# Patient Record
Sex: MALE | Race: WHITE
[De-identification: names, ages, dates, MRNs, and addresses within clinical notes are randomized per-mention and may not be internally consistent; named-entity substitution may affect disease eponyms.]

---

## 2017-10-05 ENCOUNTER — HOSPITAL ENCOUNTER (EMERGENCY)
Dept: HOSPITAL 76 - ED | Age: 22
Discharge: HOME | End: 2017-10-05
Payer: COMMERCIAL

## 2017-10-05 VITALS — DIASTOLIC BLOOD PRESSURE: 79 MMHG | SYSTOLIC BLOOD PRESSURE: 114 MMHG

## 2017-10-05 DIAGNOSIS — S68.122A: Primary | ICD-10-CM
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
## 2017-10-05 PROCEDURE — 1040M: CPT

## 2017-10-05 PROCEDURE — 99283 EMERGENCY DEPT VISIT LOW MDM: CPT

## 2017-10-05 NOTE — ED PHYSICIAN DOCUMENTATION
History of Present Illness





- Stated complaint


Stated Complaint: FINGER LAC





- Chief complaint


Chief Complaint: Wound





- Additonal information


Additional information: 





hx from pt


R third fingertip amputation on mandelin at work


healthy


tdap UTD





Review of Systems


Skin: reports: Other (fingertip amp)





PD PAST MEDICAL HISTORY





- Past Medical History


Past Medical History: Yes





- Present Medications


Home Medications: 


 Ambulatory Orders











 Medication  Instructions  Recorded  Confirmed


 


Escitalopram [Lexapro] 10 mg PO DAILY 10/05/17 10/05/17














- Allergies


Allergies/Adverse Reactions: 


 Allergies











Allergy/AdvReac Type Severity Reaction Status Date / Time


 


No Known Drug Allergies Allergy   Verified 10/05/17 08:17














- Social History


Does the pt smoke?: No


Smoking Status: Never smoker





- Immunizations


Immunizations are current?: Yes





PD ED PE NORMAL





- Vitals


Vital signs reviewed: Yes





- Extremities


Extremities: Other (R third finger small 1< 1 cm ST complete avulsion, no nail 

or bone involves, MSV intact)





Results





- Vitals


Vitals: 


 Vital Signs - 24 hr











  10/05/17





  08:17


 


Temperature 36.4 C L


 


Heart Rate 78


 


Respiratory 16





Rate 


 


Blood Pressure 116/76


 


O2 Saturation 99








 Oxygen











O2 Source                      Room air

















Departure





- Departure


Disposition: 01 Home, Self Care


Clinical Impression: 


Fingertip amputation


Qualifiers:


 Encounter type: initial encounter Qualified Code(s): S68.129A - Partial 

traumatic metacarpophalangeal amputation of unspecified finger, initial 

encounter





Condition: Good


Instructions:  ED Laceration Amputation Finger Tip Open Tx


Comments: 


Leave the dressing we placed on for 48 hr.


Then may gently remove (recommend soaking in water first to prevent sticking)


After that should be able to gently wash, apply antibiotic ointment and a clean 

fingertip bandaid once daily.


This will take about 2 weeks to heal.


This was a clean cut and so infection is unlikely but if you notice increasing 

redness swelling or pain, please come back for a wound check

## 2018-10-22 ENCOUNTER — HOSPITAL ENCOUNTER (EMERGENCY)
Dept: HOSPITAL 76 - ED | Age: 23
Discharge: HOME | End: 2018-10-22
Payer: COMMERCIAL

## 2018-10-22 VITALS — DIASTOLIC BLOOD PRESSURE: 78 MMHG | SYSTOLIC BLOOD PRESSURE: 121 MMHG

## 2018-10-22 DIAGNOSIS — M54.6: Primary | ICD-10-CM

## 2018-10-22 LAB
CLARITY UR REFRACT.AUTO: CLEAR
GLUCOSE UR QL STRIP.AUTO: NEGATIVE MG/DL
KETONES UR QL STRIP.AUTO: NEGATIVE MG/DL
NITRITE UR QL STRIP.AUTO: NEGATIVE
PH UR STRIP.AUTO: 7 PH (ref 5–7.5)
PROT UR STRIP.AUTO-MCNC: NEGATIVE MG/DL
RBC # UR STRIP.AUTO: NEGATIVE /UL
SP GR UR STRIP.AUTO: 1.01 (ref 1–1.03)
UROBILINOGEN UR QL STRIP.AUTO: (no result) E.U./DL
UROBILINOGEN UR STRIP.AUTO-MCNC: NEGATIVE MG/DL

## 2018-10-22 PROCEDURE — 81003 URINALYSIS AUTO W/O SCOPE: CPT

## 2018-10-22 PROCEDURE — 81001 URINALYSIS AUTO W/SCOPE: CPT

## 2018-10-22 PROCEDURE — 87086 URINE CULTURE/COLONY COUNT: CPT

## 2018-10-22 PROCEDURE — 71046 X-RAY EXAM CHEST 2 VIEWS: CPT

## 2018-10-22 PROCEDURE — 99283 EMERGENCY DEPT VISIT LOW MDM: CPT

## 2018-10-22 NOTE — XRAY REPORT
Reason:  back pain

Procedure Date:  10/22/2018   

Accession Number:  [id number] / [id number]                    

Procedure:  XR  - Chest 2 View X-Ray CPT Code:  35902

 

FULL RESULT:

 

 

EXAM:

CHEST RADIOGRAPHY

 

EXAM DATE: 10/22/2018 04:36 PM.

 

CLINICAL HISTORY: Back pain.

 

COMPARISON: None available.

 

TECHNIQUE: 2 views.

 

FINDINGS:

Heart size is normal. Mild asymmetric elevation of the left 

hemidiaphragm, which is nonspecific. No consolidation, pleural effusion, 

or pneumothorax.

IMPRESSION: No acute cardiopulmonary findings.

 

[facility]

## 2018-10-22 NOTE — ED PHYSICIAN DOCUMENTATION
PD HPI BACK INJURY





- Stated complaint


Stated Complaint: BACK/LEG PX





- History obtained from


History obtained from: Patient





- History of Present Illness


Location: Upper (Since yesterday without specific trauma he has had upper back 

pain that is worse with twisting or bending.  It radiates throughout the chest 

and down to the lower back.  It makes him short of breath.  Denies pedal edema 

or calf pain or recent travel.  No fevers.  No urinary complaints or hematuria. 

He had a similar episode for a few days in February that resolved without 

specific intervention.)





Review of Systems


Constitutional: reports: Reviewed and negative


Cardiac: reports: Reviewed and negative


Respiratory: reports: Reviewed and negative





PD PAST MEDICAL HISTORY





- Past Medical History


Past Medical History: No


Cardiovascular: None


Respiratory: None


Neuro: None


Endocrine/Autoimmune: None


GI: None


: None


HEENT: None


Psych: None


Musculoskeletal: None


Derm: None





- Past Surgical History


Past Surgical History: No





- Present Medications


Home Medications: 


                                Ambulatory Orders











 Medication  Instructions  Recorded  Confirmed


 


Cyclobenzaprine [Flexeril] 10 mg PO TID PRN #20 tablet 10/22/18 


 


Hydrocodone/Acetaminophen 1 - 2 each PO Q6H PRN #14 tablet 10/22/18 





[Hydrocodon-Acetaminophen 5-325]   














- Allergies


Allergies/Adverse Reactions: 


                                    Allergies











Allergy/AdvReac Type Severity Reaction Status Date / Time


 


No Known Drug Allergies Allergy   Verified 10/22/18 15:37














- Social History


Does the pt smoke?: No


Smoking Status: Never smoker


Does the pt drink ETOH?: No


Does the pt have substance abuse?: No





- Immunizations


Immunizations are current?: Yes





- POLST


Patient has POLST: No





PD ED PE NORMAL





- Vitals


Vital signs reviewed: Yes





- General


General: Alert and oriented X 3, No acute distress





- Cardiac


Cardiac: RRR, No murmur





- Respiratory


Respiratory: No respiratory distress, Clear bilaterally





- Abdomen


Abdomen: Non tender





- Back


Back: Other (Kind of diffuse muscular tenderness of the bilateral parathoracic 

muscles and he is hypersensitive over the whole back.  No specific tenderness.)





- Extremities


Extremities: Other (The patient has equal and normal Achilles and patellar 

reflexes bilaterally.  Normal sensation in all areas of the legs.  Patient 

denies saddle anesthesia.  Normal strength in flexion-extension at the ankles, 

knees, and flexion of the hips.)





- Neuro


Neuro: Alert and oriented X 3, Normal speech





Results





- Vitals


Vitals: 


                               Vital Signs - 24 hr











  10/22/18 10/22/18





  15:34 17:21


 


Temperature 36.5 C 


 


Heart Rate 97 80


 


Respiratory 16 





Rate  


 


Blood Pressure 122/73 121/78


 


O2 Saturation 100 100








                                     Oxygen











O2 Source                      Room air

















- Labs


Labs: 


                                Laboratory Tests











  10/22/18





  16:50


 


Urine Color  STRAW


 


Urine Clarity  CLEAR


 


Urine pH  7.0


 


Ur Specific Gravity  1.010


 


Urine Protein  NEGATIVE


 


Urine Glucose (UA)  NEGATIVE


 


Urine Ketones  NEGATIVE


 


Urine Occult Blood  NEGATIVE


 


Urine Nitrite  NEGATIVE


 


Urine Bilirubin  NEGATIVE


 


Urine Urobilinogen  0.2 (NORMAL)


 


Ur Leukocyte Esterase  NEGATIVE


 


Ur Microscopic Review  NOT INDICATED


 


Urine Culture Comments  NOT INDICATED














- Rads (name of study)


  ** 2v chest


Radiology: EMP read contemporaneously (normal)





PD MEDICAL DECISION MAKING





- ED course


ED course: 





23-year-old gentleman with seemingly musculoskeletal upper back pain with 

tenderness and motion related complaints. But some atypical complaints So we 

obtained a urinalysis to rule out hematuria/kidney stone. Also a chest x-ray 

given radiation to the chest, these were negative and he was feeling better 

after meds.





Departure





- Departure


Disposition: 01 Home, Self Care


Clinical Impression: 


 Back pain





Condition: Good


Record reviewed to determine appropriate education?: Yes


Instructions:  ED Neck Back Pain General


Prescriptions: 


Cyclobenzaprine [Flexeril] 10 mg PO TID PRN #20 tablet


 PRN Reason: Spasms


Hydrocodone/Acetaminophen [Hydrocodon-Acetaminophen 5-325] 1 - 2 each PO Q6H PRN

#14 tablet


 PRN Reason: pain


Comments: 


Call your doctor to arrange a follow-up appointment, make the next available 

appointment.  In the interim, return anytime if worse or if new symptoms 

develop.





Do not drink or drive while taking narcotic pain medication.


Note that many narcotic pain relievers also contain Tylenol/acetaminophen.  

Please ensure that your total dose of acetaminophen from all sources does not 

exceed 3 g (3000 mg) per day.


You may get constipated while on this medication.  Take a stool softener such as

Colace twice a day while you are on it.  Also add an over-the-counter laxative 

such as senna or MiraLAX on any day that you do not have a bowel movement.


If you received a narcotic pain medication or sedative while in the emergency 

department, do not drive for the next 24 hours.


Forms:  Activity restrictions


Discharge Date/Time: 10/22/18 17:26

## 2019-02-24 ENCOUNTER — HOSPITAL ENCOUNTER (EMERGENCY)
Dept: HOSPITAL 76 - ED | Age: 24
Discharge: HOME | End: 2019-02-24
Payer: COMMERCIAL

## 2019-02-24 VITALS — SYSTOLIC BLOOD PRESSURE: 123 MMHG | DIASTOLIC BLOOD PRESSURE: 70 MMHG

## 2019-02-24 DIAGNOSIS — R07.89: Primary | ICD-10-CM

## 2019-02-24 DIAGNOSIS — M54.2: ICD-10-CM

## 2019-02-24 PROCEDURE — 93005 ELECTROCARDIOGRAM TRACING: CPT

## 2019-02-24 PROCEDURE — 71045 X-RAY EXAM CHEST 1 VIEW: CPT

## 2019-02-24 PROCEDURE — 99283 EMERGENCY DEPT VISIT LOW MDM: CPT

## 2019-02-24 NOTE — XRAY REPORT
Reason:  chest pain

Procedure Date:  02/24/2019   

Accession Number:  [id number] / [id number]                    

Procedure:  XR  - Chest 1 View X-Ray CPT Code:  95306

 

FULL RESULT:

 

 

EXAM:

CHEST RADIOGRAPHY

 

EXAM DATE: 2/24/2019 12:40 PM.

 

CLINICAL HISTORY: Chest pain.

 

COMPARISON: Chest 2 view 10/22/2018 4:25 PM.

 

TECHNIQUE: 1 view.

 

FINDINGS:

Lungs/Pleura: There is a background of mild increased interstitial 

markings and overall increased density in the lungs compared to previous 

study. Relatively low lung volumes, however. No pneumothorax.

 

Mediastinum: Within exam limitations, the cardiomediastinal contour is 

normal.

 

Other: None.

 

IMPRESSION:

1. No pneumothorax.

2. Background of increased interstitial markings and overall increased 

density compared to previous exam. Relatively low lung volumes.

 

[facility]

## 2019-02-24 NOTE — ED PHYSICIAN DOCUMENTATION
PD HPI CHEST PAIN





- Stated complaint


Stated Complaint: DIFFICULTY BREATHING/UPPER ABD PX





- Chief complaint


Chief Complaint: Resp





- History obtained from


History obtained from: Patient





- History of Present Illness


Timing - onset: How many hours ago (3 days)


Timing - onset during: Other (all the time)


Timing - duration: Other (constant)


Timing - details: Gradual onset


Pain level max: 4


Pain level now: 4


Severity Comments: mild


Quality: Sharp


Location: Right chest


Radiation: Other (none)


Improved by: Other (none)





PD PAST MEDICAL HISTORY





- Past Medical History


Past Medical History: No


Cardiovascular: None


Respiratory: None


Neuro: None


Endocrine/Autoimmune: None


GI: None


: None


HEENT: None


Psych: None


Musculoskeletal: None


Derm: None





- Past Surgical History


Past Surgical History: No





- Present Medications


Home Medications: 


                                Ambulatory Orders











 Medication  Instructions  Recorded  Confirmed


 


Cyclobenzaprine [Flexeril] 10 mg PO TID PRN #20 tablet 10/22/18 


 


Hydrocodone/Acetaminophen 1 - 2 each PO Q6H PRN #14 tablet 10/22/18 





[Hydrocodon-Acetaminophen 5-325]   


 


Acetaminophen [Tylenol Extra 1,000 mg PO TID #60 tablet 02/24/19 





Strength]   


 


Ibuprofen [Ibu] 800 mg PO TID #60 tablet 02/24/19 














- Allergies


Allergies/Adverse Reactions: 


                                    Allergies











Allergy/AdvReac Type Severity Reaction Status Date / Time


 


No Known Drug Allergies Allergy   Verified 02/24/19 11:53














- Social History


Does the pt smoke?: No


Smoking Status: Never smoker


Does the pt drink ETOH?: No


Does the pt have substance abuse?: No





- Immunizations


Immunizations are current?: Yes





- POLST


Patient has POLST: No





Results





- Vitals


Vitals: 


                               Vital Signs - 24 hr











  02/24/19





  11:51


 


Temperature 36.8 C


 


Heart Rate 82


 


Respiratory 22





Rate 


 


Blood Pressure 122/85 H


 


O2 Saturation 98








                                     Oxygen











O2 Source                      Room air

















- EKG (time done)


  ** 1252


Rate: Rate (enter#) (77)


Rhythm: NSR


Axis: Normal


Intervals: Normal PA, QRS normal.  No: Prolonged QT


Ischemia: Normal ST segments.  No: T wave inversion


Computer interpretation: Disagree with computer





- Rads (name of study)


  ** Chest


Radiology: Final report received (1. No pneumothorax. 2. Background of increased

 interstitial markings and overall increased density.  )





PD MEDICAL DECISION MAKING





- ED course


Complexity details: considered differential, d/w patient


ED course: 





24-year-old male with right lower rib pain worse with inspiration.Unremarkable 

chest x-ray and EKG.Start with symptomatic treatment and primary care follow-up.

  Patient is PERC negative with very low suspicion at this time for pulmonary 

embolism.  No evidence on x-ray of dissection.  Patient is extremely low risk 

for ACS.





Departure





- Departure


Disposition: 01 Home, Self Care


Clinical Impression: 


 Chest wall pain





Condition: Good


Instructions:  ED Chest Pain Costochondritis


Follow-Up: 


KAR CHOI [Primary Care Provider] - 


Prescriptions: 


Acetaminophen [Tylenol Extra Strength] 1,000 mg PO TID #60 tablet


Ibuprofen [Ibu] 800 mg PO TID #60 tablet


Comments: 


Take Tylenol and ibuprofen as needed according to prescription for chest wall 

pain.  Follow-up with PCP within 24 hours.  Return with worsening symptoms.